# Patient Record
Sex: FEMALE | Race: WHITE | NOT HISPANIC OR LATINO | Employment: UNEMPLOYED | URBAN - METROPOLITAN AREA
[De-identification: names, ages, dates, MRNs, and addresses within clinical notes are randomized per-mention and may not be internally consistent; named-entity substitution may affect disease eponyms.]

---

## 2017-03-30 ENCOUNTER — OFFICE VISIT (OUTPATIENT)
Dept: NEUROLOGY | Facility: CLINIC | Age: 57
End: 2017-03-30

## 2017-03-30 VITALS
HEIGHT: 69 IN | OXYGEN SATURATION: 99 % | WEIGHT: 240 LBS | SYSTOLIC BLOOD PRESSURE: 162 MMHG | BODY MASS INDEX: 35.55 KG/M2 | HEART RATE: 77 BPM | DIASTOLIC BLOOD PRESSURE: 88 MMHG

## 2017-03-30 DIAGNOSIS — G51.39 FACIAL NERVE SPASM: ICD-10-CM

## 2017-03-30 DIAGNOSIS — G24.5 BLEPHAROSPASM: ICD-10-CM

## 2017-03-30 DIAGNOSIS — G40.309 GENERALIZED EPILEPSY (HCC): ICD-10-CM

## 2017-03-30 DIAGNOSIS — B02.21 RAMSAY HUNT SYNDROME (GENICULATE HERPES ZOSTER): ICD-10-CM

## 2017-03-30 PROCEDURE — 99204 OFFICE O/P NEW MOD 45 MIN: CPT | Performed by: PSYCHIATRY & NEUROLOGY

## 2017-03-30 RX ORDER — TRAZODONE HYDROCHLORIDE 50 MG/1
TABLET ORAL
Refills: 0 | COMMUNITY
Start: 2017-03-20

## 2017-03-30 RX ORDER — CLOBAZAM 10 MG/1
10 TABLET ORAL EVERY 12 HOURS
Qty: 60 TABLET | Refills: 1 | Status: SHIPPED | OUTPATIENT
Start: 2017-03-30 | End: 2017-04-29

## 2017-03-30 RX ORDER — PRAMIPEXOLE DIHYDROCHLORIDE 1 MG/1
TABLET ORAL
Refills: 0 | COMMUNITY
Start: 2017-03-20

## 2017-03-30 RX ORDER — SERTRALINE HYDROCHLORIDE 100 MG/1
TABLET, FILM COATED ORAL
Refills: 0 | COMMUNITY
Start: 2017-03-20

## 2017-06-13 RX ORDER — CLOBAZAM 10 MG/1
10 TABLET ORAL 2 TIMES DAILY
Qty: 60 TABLET | Refills: 2 | Status: SHIPPED | OUTPATIENT
Start: 2017-06-13 | End: 2017-07-10 | Stop reason: SDUPTHER

## 2017-06-13 NOTE — TELEPHONE ENCOUNTER
----- Message from Kaitlyn Smith sent at 6/13/2017 12:08 PM EDT -----  Contact: [phone]  Patient running out of her seizure meds and needs a refill in till her next appointment she cannot miss a day.

## 2017-06-13 NOTE — TELEPHONE ENCOUNTER
Inform the patient that I filled the prescription for Clobazam 10 mg by mouth twice a day with 2 refills.

## 2017-06-14 RX ORDER — CLOBAZAM 10 MG/1
10 TABLET ORAL
Qty: 60 TABLET | OUTPATIENT
Start: 2017-06-14

## 2017-07-10 RX ORDER — CLOBAZAM 10 MG/1
10 TABLET ORAL 2 TIMES DAILY
Qty: 60 TABLET | Refills: 2 | Status: SHIPPED | OUTPATIENT
Start: 2017-07-10 | End: 2017-08-09